# Patient Record
Sex: FEMALE | Race: BLACK OR AFRICAN AMERICAN | URBAN - METROPOLITAN AREA
[De-identification: names, ages, dates, MRNs, and addresses within clinical notes are randomized per-mention and may not be internally consistent; named-entity substitution may affect disease eponyms.]

---

## 2022-04-30 ENCOUNTER — TELEPHONE ENCOUNTER (OUTPATIENT)
Dept: URBAN - METROPOLITAN AREA CLINIC 121 | Facility: CLINIC | Age: 52
End: 2022-04-30

## 2022-04-30 RX ORDER — FERROUS SULFATE TAB EC 325 MG (65 MG FE EQUIVALENT) 325 (65 FE) MG
TABLET DELAYED RESPONSE ORAL
OUTPATIENT
Start: 2014-04-08

## 2022-04-30 RX ORDER — VALACYCLOVIR HYDROCHLORIDE 500 MG/1
TABLET, FILM COATED ORAL
OUTPATIENT
Start: 2014-04-08 | End: 2017-04-04

## 2022-04-30 RX ORDER — VALACYCLOVIR HYDROCHLORIDE 500 MG/1
TABLET, FILM COATED ORAL
OUTPATIENT
Start: 2014-04-08

## 2022-04-30 RX ORDER — CYCLOBENZAPRINE HYDROCHLORIDE 10 MG/1
TABLET, FILM COATED ORAL
OUTPATIENT
Start: 2014-04-08

## 2022-04-30 RX ORDER — CYCLOBENZAPRINE HYDROCHLORIDE 10 MG/1
TABLET, FILM COATED ORAL
OUTPATIENT
Start: 2014-04-08 | End: 2017-04-04

## 2022-04-30 RX ORDER — NICOTINE POLACRILEX 2 MG
GUM BUCCAL
OUTPATIENT
Start: 2014-04-08 | End: 2017-04-04

## 2022-04-30 RX ORDER — FLUOXETINE HYDROCHLORIDE 60 MG/1
TABLET ORAL
OUTPATIENT
Start: 2014-04-08

## 2022-04-30 RX ORDER — NICOTINE POLACRILEX 2 MG
GUM BUCCAL
OUTPATIENT
Start: 2014-04-08

## 2022-04-30 RX ORDER — FERROUS SULFATE TAB EC 325 MG (65 MG FE EQUIVALENT) 325 (65 FE) MG
TABLET DELAYED RESPONSE ORAL
OUTPATIENT
Start: 2014-04-08 | End: 2017-04-04

## 2022-05-01 ENCOUNTER — TELEPHONE ENCOUNTER (OUTPATIENT)
Dept: URBAN - METROPOLITAN AREA CLINIC 121 | Facility: CLINIC | Age: 52
End: 2022-05-01

## 2022-05-01 RX ORDER — FLUOXETINE HYDROCHLORIDE 20 MG/1
CAPSULE ORAL
Status: ACTIVE | COMMUNITY
Start: 2017-04-04

## 2022-05-01 RX ORDER — SUCRALFATE 1 G/10ML
1 GM PO 30 MINUTES BEFORE QAC AND QHS SUSPENSION ORAL
Status: ACTIVE | COMMUNITY
Start: 2017-04-04

## 2022-05-01 RX ORDER — METHOCARBAMOL 750 MG/1
TABLET, FILM COATED ORAL
Status: ACTIVE | COMMUNITY
Start: 2017-04-04

## 2022-05-01 RX ORDER — FOLIC ACID 1 MG/1
TABLET ORAL
Status: ACTIVE | COMMUNITY
Start: 2017-04-04

## 2022-05-01 RX ORDER — ELECTROLYTES/DEXTROSE
SOLUTION, ORAL ORAL
Status: ACTIVE | COMMUNITY
Start: 2014-04-08

## 2024-11-22 ENCOUNTER — P2P PATIENT RECORD (OUTPATIENT)
Age: 54
End: 2024-11-22

## 2024-11-25 ENCOUNTER — OFFICE VISIT (OUTPATIENT)
Dept: URBAN - METROPOLITAN AREA CLINIC 27 | Facility: CLINIC | Age: 54
End: 2024-11-25
Payer: OTHER GOVERNMENT

## 2024-11-25 ENCOUNTER — DASHBOARD ENCOUNTERS (OUTPATIENT)
Age: 54
End: 2024-11-25

## 2024-11-25 ENCOUNTER — LAB OUTSIDE AN ENCOUNTER (OUTPATIENT)
Dept: URBAN - METROPOLITAN AREA CLINIC 27 | Facility: CLINIC | Age: 54
End: 2024-11-25

## 2024-11-25 VITALS
HEIGHT: 68 IN | HEART RATE: 64 BPM | BODY MASS INDEX: 23.49 KG/M2 | WEIGHT: 155 LBS | DIASTOLIC BLOOD PRESSURE: 79 MMHG | SYSTOLIC BLOOD PRESSURE: 110 MMHG

## 2024-11-25 DIAGNOSIS — E11.9 TYPE 2 DIABETES MELLITUS WITHOUT COMPLICATION, WITHOUT LONG-TERM CURRENT USE OF INSULIN: ICD-10-CM

## 2024-11-25 DIAGNOSIS — Z87.19 HISTORY OF HEMATEMESIS: ICD-10-CM

## 2024-11-25 DIAGNOSIS — R11.0 NAUSEA: ICD-10-CM

## 2024-11-25 DIAGNOSIS — Z86.0100 PERSONAL HISTORY OF COLONIC POLYPS: ICD-10-CM

## 2024-11-25 PROCEDURE — 99204 OFFICE O/P NEW MOD 45 MIN: CPT | Performed by: INTERNAL MEDICINE

## 2024-11-25 RX ORDER — METHOCARBAMOL 750 MG/1
TABLET, FILM COATED ORAL
Status: ACTIVE | COMMUNITY
Start: 2017-04-04

## 2024-11-25 RX ORDER — FLUOXETINE HYDROCHLORIDE 20 MG/1
CAPSULE ORAL
Status: ACTIVE | COMMUNITY
Start: 2017-04-04

## 2024-11-25 RX ORDER — FOLIC ACID 1 MG/1
TABLET ORAL
Status: ACTIVE | COMMUNITY
Start: 2017-04-04

## 2024-11-25 RX ORDER — ELECTROLYTES/DEXTROSE
SOLUTION, ORAL ORAL
Status: ACTIVE | COMMUNITY
Start: 2014-04-08

## 2024-11-25 RX ORDER — SUCRALFATE 1 G/10ML
1 GM PO 30 MINUTES BEFORE QAC AND QHS SUSPENSION ORAL
Status: ACTIVE | COMMUNITY
Start: 2017-04-04

## 2024-11-25 NOTE — HPI-TODAY'S VISIT:
Mrs. Silver is a 54-year-old -American female who presents today per referral by Dr. Tadeo for evaluation of nausea and hematemesis.  She reports that she has been experiencing chronic intermittent nausea over the past month.  She reports having a couple episodes of vomiting and noticed blood in it a couple weeks ago.  She is status post gastric bypass in 2009 and also has early satiety.  She denies any rectal bleeding.  She has regular bowel movements.  She has a history of colon polyps and is due for surveillance.  Otherwise, she has no other GI complaints.  The remainder of her medical history is significant for low back pain, peripheral neuropathy and diabetes mellitus.

## 2024-11-27 LAB — H PYLORI BREATH TEST: NOT DETECTED

## 2024-11-28 ENCOUNTER — LAB OUTSIDE AN ENCOUNTER (OUTPATIENT)
Dept: URBAN - METROPOLITAN AREA CLINIC 27 | Facility: CLINIC | Age: 54
End: 2024-11-28

## 2024-11-28 PROBLEM — 313436004: Status: ACTIVE | Noted: 2024-11-28

## 2024-11-28 PROBLEM — 428283002: Status: ACTIVE | Noted: 2024-11-28

## 2024-11-28 PROBLEM — 422587007: Status: ACTIVE | Noted: 2024-11-28

## 2024-11-28 PROBLEM — 161538007: Status: ACTIVE | Noted: 2024-11-28

## 2024-12-03 ENCOUNTER — P2P PATIENT RECORD (OUTPATIENT)
Age: 54
End: 2024-12-03

## 2024-12-09 ENCOUNTER — OFFICE VISIT (OUTPATIENT)
Dept: URBAN - METROPOLITAN AREA SURGERY CENTER 7 | Facility: SURGERY CENTER | Age: 54
End: 2024-12-09
Payer: OTHER GOVERNMENT

## 2024-12-09 ENCOUNTER — TELEPHONE ENCOUNTER (OUTPATIENT)
Dept: URBAN - METROPOLITAN AREA CLINIC 27 | Facility: CLINIC | Age: 54
End: 2024-12-09

## 2024-12-09 DIAGNOSIS — K28.9 ULCER JEJUNUM: ICD-10-CM

## 2024-12-09 DIAGNOSIS — R11.0 NAUSEA: ICD-10-CM

## 2024-12-09 DIAGNOSIS — Z98.84 PERSONAL HISTORY OF GASTRIC BYPASS: ICD-10-CM

## 2024-12-09 PROCEDURE — 00731 ANES UPR GI NDSC PX NOS: CPT | Performed by: NURSE ANESTHETIST, CERTIFIED REGISTERED

## 2024-12-09 PROCEDURE — 43235 EGD DIAGNOSTIC BRUSH WASH: CPT | Performed by: INTERNAL MEDICINE

## 2024-12-09 RX ORDER — SUCRALFATE 1 G/10ML
1 GM PO 30 MINUTES BEFORE QAC AND QHS SUSPENSION ORAL
Status: ACTIVE | COMMUNITY
Start: 2017-04-04

## 2024-12-09 RX ORDER — FOLIC ACID 1 MG/1
TABLET ORAL
Status: ACTIVE | COMMUNITY
Start: 2017-04-04

## 2024-12-09 RX ORDER — ELECTROLYTES/DEXTROSE
SOLUTION, ORAL ORAL
Status: ACTIVE | COMMUNITY
Start: 2014-04-08

## 2024-12-09 RX ORDER — FLUOXETINE HYDROCHLORIDE 20 MG/1
CAPSULE ORAL
Status: ACTIVE | COMMUNITY
Start: 2017-04-04

## 2024-12-09 RX ORDER — METHOCARBAMOL 750 MG/1
TABLET, FILM COATED ORAL
Status: ACTIVE | COMMUNITY
Start: 2017-04-04

## 2024-12-09 RX ORDER — SUCRALFATE 1 G/1
1 TABLET ON AN EMPTY STOMACH TABLET ORAL TWICE A DAY
Qty: 60 | Refills: 3 | OUTPATIENT
Start: 2024-12-09 | End: 2025-04-08

## 2024-12-10 ENCOUNTER — CLAIMS CREATED FROM THE CLAIM WINDOW (OUTPATIENT)
Dept: URBAN - METROPOLITAN AREA SURGERY CENTER 7 | Facility: SURGERY CENTER | Age: 54
End: 2024-12-10
Payer: OTHER GOVERNMENT

## 2024-12-10 ENCOUNTER — OFFICE VISIT (OUTPATIENT)
Dept: URBAN - METROPOLITAN AREA SURGERY CENTER 7 | Facility: SURGERY CENTER | Age: 54
End: 2024-12-10

## 2024-12-10 DIAGNOSIS — Z86.0100 PERSONAL HISTORY OF COLONIC POLYPS: ICD-10-CM

## 2024-12-10 DIAGNOSIS — R19.5 STOOL CONTENTS FINDING, ABNORMAL: ICD-10-CM

## 2024-12-10 DIAGNOSIS — Z12.11 COLON CANCER SCREENING (HIGH RISK): ICD-10-CM

## 2024-12-10 PROCEDURE — 00812 ANES LWR INTST SCR COLSC: CPT | Performed by: NURSE ANESTHETIST, CERTIFIED REGISTERED

## 2024-12-10 RX ORDER — SUCRALFATE 1 G/1
1 TABLET ON AN EMPTY STOMACH TABLET ORAL TWICE A DAY
Qty: 60 | Refills: 3 | Status: ACTIVE | COMMUNITY
Start: 2024-12-09 | End: 2025-04-08

## 2024-12-10 RX ORDER — FOLIC ACID 1 MG/1
TABLET ORAL
Status: ACTIVE | COMMUNITY
Start: 2017-04-04

## 2024-12-10 RX ORDER — SUCRALFATE 1 G/10ML
1 GM PO 30 MINUTES BEFORE QAC AND QHS SUSPENSION ORAL
Status: ACTIVE | COMMUNITY
Start: 2017-04-04

## 2024-12-10 RX ORDER — FLUOXETINE HYDROCHLORIDE 20 MG/1
CAPSULE ORAL
Status: ACTIVE | COMMUNITY
Start: 2017-04-04

## 2024-12-10 RX ORDER — METHOCARBAMOL 750 MG/1
TABLET, FILM COATED ORAL
Status: ACTIVE | COMMUNITY
Start: 2017-04-04

## 2024-12-10 RX ORDER — ELECTROLYTES/DEXTROSE
SOLUTION, ORAL ORAL
Status: ACTIVE | COMMUNITY
Start: 2014-04-08

## 2024-12-17 ENCOUNTER — CLAIMS CREATED FROM THE CLAIM WINDOW (OUTPATIENT)
Dept: URBAN - METROPOLITAN AREA SURGERY CENTER 7 | Facility: SURGERY CENTER | Age: 54
End: 2024-12-17
Payer: OTHER GOVERNMENT

## 2024-12-17 DIAGNOSIS — Z86.0100 PERSONAL HISTORY OF COLONIC POLYPS: ICD-10-CM

## 2024-12-17 DIAGNOSIS — Z86.0100 PERSONAL HISTORY OF COLON POLYPS, UNSPECIFIED: ICD-10-CM

## 2024-12-17 DIAGNOSIS — Z12.11 COLON CANCER SCREENING (HIGH RISK): ICD-10-CM

## 2024-12-17 PROCEDURE — G0105 COLORECTAL SCRN; HI RISK IND: HCPCS | Performed by: INTERNAL MEDICINE

## 2024-12-17 PROCEDURE — G9998 DOC MED RSN <3 COLON: HCPCS | Performed by: INTERNAL MEDICINE

## 2024-12-17 PROCEDURE — 00812 ANES LWR INTST SCR COLSC: CPT | Performed by: NURSE ANESTHETIST, CERTIFIED REGISTERED

## 2024-12-17 RX ORDER — SUCRALFATE 1 G/10ML
1 GM PO 30 MINUTES BEFORE QAC AND QHS SUSPENSION ORAL
Status: ACTIVE | COMMUNITY
Start: 2017-04-04

## 2024-12-17 RX ORDER — METHOCARBAMOL 750 MG/1
TABLET, FILM COATED ORAL
Status: ACTIVE | COMMUNITY
Start: 2017-04-04

## 2024-12-17 RX ORDER — ELECTROLYTES/DEXTROSE
SOLUTION, ORAL ORAL
Status: ACTIVE | COMMUNITY
Start: 2014-04-08

## 2024-12-17 RX ORDER — FOLIC ACID 1 MG/1
TABLET ORAL
Status: ACTIVE | COMMUNITY
Start: 2017-04-04

## 2024-12-17 RX ORDER — FLUOXETINE HYDROCHLORIDE 20 MG/1
CAPSULE ORAL
Status: ACTIVE | COMMUNITY
Start: 2017-04-04

## 2024-12-17 RX ORDER — SUCRALFATE 1 G/1
1 TABLET ON AN EMPTY STOMACH TABLET ORAL TWICE A DAY
Qty: 60 | Refills: 3 | Status: ACTIVE | COMMUNITY
Start: 2024-12-09 | End: 2025-04-08

## 2025-01-22 ENCOUNTER — TELEPHONE ENCOUNTER (OUTPATIENT)
Dept: URBAN - METROPOLITAN AREA CLINIC 27 | Facility: CLINIC | Age: 55
End: 2025-01-22

## 2025-01-22 ENCOUNTER — OFFICE VISIT (OUTPATIENT)
Dept: URBAN - METROPOLITAN AREA TELEHEALTH 2 | Facility: TELEHEALTH | Age: 55
End: 2025-01-22
Payer: OTHER GOVERNMENT

## 2025-01-22 DIAGNOSIS — Z86.0100 PERSONAL HISTORY OF COLONIC POLYPS: ICD-10-CM

## 2025-01-22 DIAGNOSIS — R11.0 NAUSEA: ICD-10-CM

## 2025-01-22 PROCEDURE — 99443 PHONE E/M BY PHYS 21-30 MIN: CPT | Performed by: INTERNAL MEDICINE

## 2025-01-22 PROCEDURE — 99213 OFFICE O/P EST LOW 20 MIN: CPT | Performed by: INTERNAL MEDICINE

## 2025-01-22 RX ORDER — METHOCARBAMOL 750 MG/1
TABLET, FILM COATED ORAL
Status: ACTIVE | COMMUNITY
Start: 2017-04-04

## 2025-01-22 RX ORDER — FOLIC ACID 1 MG/1
TABLET ORAL
Status: ACTIVE | COMMUNITY
Start: 2017-04-04

## 2025-01-22 RX ORDER — SUCRALFATE 1 G/10ML
1 GM PO 30 MINUTES BEFORE QAC AND QHS SUSPENSION ORAL
Status: ACTIVE | COMMUNITY
Start: 2017-04-04

## 2025-01-22 RX ORDER — FLUOXETINE HYDROCHLORIDE 20 MG/1
CAPSULE ORAL
Status: ACTIVE | COMMUNITY
Start: 2017-04-04

## 2025-01-22 RX ORDER — ELECTROLYTES/DEXTROSE
SOLUTION, ORAL ORAL
Status: ACTIVE | COMMUNITY
Start: 2014-04-08

## 2025-01-22 RX ORDER — SUCRALFATE 1 G/1
1 TABLET ON AN EMPTY STOMACH TABLET ORAL TWICE A DAY
Qty: 60 | Refills: 3 | Status: ACTIVE | COMMUNITY
Start: 2024-12-09 | End: 2025-04-08

## 2025-01-22 NOTE — HPI-TODAY'S VISIT:
Mrs. Silver calls in today for her telehealth visit for follow-up of her chronic nausea.  I performed an upper endoscopy which was most notable for some jejunal erosions and I prescribed Carafate for treatment.  She reports that she is feeling completely better and is still taking Carafate.  Her colonoscopy which was performed was also unremarkable.  Otherwise, she has no other GI complaints.